# Patient Record
Sex: MALE | Race: WHITE | Employment: UNEMPLOYED | URBAN - METROPOLITAN AREA
[De-identification: names, ages, dates, MRNs, and addresses within clinical notes are randomized per-mention and may not be internally consistent; named-entity substitution may affect disease eponyms.]

---

## 2020-03-16 ENCOUNTER — HOSPITAL ENCOUNTER (OUTPATIENT)
Dept: LAB | Age: 40
Discharge: HOME | End: 2020-03-16
Payer: SELF-PAY

## 2020-03-16 DIAGNOSIS — I34.0 MITRAL VALVE INSUFFICIENCY, UNSPECIFIED ETIOLOGY: ICD-10-CM

## 2020-03-16 LAB — BNP SERPL-MCNC: 12 PG/ML (ref 5–125)

## 2020-03-16 PROCEDURE — 83880 ASSAY OF NATRIURETIC PEPTIDE: CPT

## 2020-03-16 PROCEDURE — 36415 COLL VENOUS BLD VENIPUNCTURE: CPT

## 2022-09-06 ENCOUNTER — TELEPHONE (OUTPATIENT)
Dept: CARDIOLOGY CLINIC | Age: 42
End: 2022-09-06

## 2022-09-06 NOTE — TELEPHONE ENCOUNTER
Last Sunday while driving from [facility] to [place] had heart palpitations and sense of heaviness in chest.He has had similar symptoms every since. This feels different that in the past.Has been having SOB. Just needs fu appt. ASAP.     Appt.made for tomorrow Jorge Fee

## 2022-09-06 NOTE — TELEPHONE ENCOUNTER
SOB, Palpitations, feels different, afraid that there is something wrong and it may be time to have valve replaced.

## 2022-09-07 ENCOUNTER — OFFICE VISIT (OUTPATIENT)
Dept: CARDIOLOGY CLINIC | Age: 42
End: 2022-09-07
Payer: COMMERCIAL

## 2022-09-07 VITALS
SYSTOLIC BLOOD PRESSURE: 118 MMHG | BODY MASS INDEX: 21.39 KG/M2 | HEART RATE: 70 BPM | HEIGHT: 74 IN | DIASTOLIC BLOOD PRESSURE: 78 MMHG | WEIGHT: 166.7 LBS

## 2022-09-07 DIAGNOSIS — I34.1 MVP (MITRAL VALVE PROLAPSE): ICD-10-CM

## 2022-09-07 DIAGNOSIS — Z76.89 ENCOUNTER TO ESTABLISH CARE: Primary | ICD-10-CM

## 2022-09-07 PROCEDURE — 99214 OFFICE O/P EST MOD 30 MIN: CPT | Performed by: INTERNAL MEDICINE

## 2022-09-07 PROCEDURE — 93000 ELECTROCARDIOGRAM COMPLETE: CPT | Performed by: INTERNAL MEDICINE

## 2022-09-07 RX ORDER — METOPROLOL SUCCINATE 25 MG/1
25 TABLET, EXTENDED RELEASE ORAL DAILY
Qty: 30 TABLET | Refills: 3 | Status: SHIPPED | OUTPATIENT
Start: 2022-09-07 | End: 2022-09-15

## 2022-09-08 DIAGNOSIS — I34.1 MVP (MITRAL VALVE PROLAPSE): ICD-10-CM

## 2022-09-09 LAB — NT PRO BNP: 9 PG/ML (ref 5–125)

## 2022-09-15 ENCOUNTER — TELEPHONE (OUTPATIENT)
Dept: CARDIOLOGY CLINIC | Age: 42
End: 2022-09-15

## 2022-09-15 DIAGNOSIS — I34.1 MVP (MITRAL VALVE PROLAPSE): ICD-10-CM

## 2022-09-15 RX ORDER — METOPROLOL SUCCINATE 25 MG/1
25 TABLET, EXTENDED RELEASE ORAL 2 TIMES DAILY
Qty: 60 TABLET | Refills: 11 | Status: SHIPPED | OUTPATIENT
Start: 2022-09-15

## 2022-09-15 NOTE — TELEPHONE ENCOUNTER
Taking Toprol and they told him they may have to increase the dose and he thinks that needs to be done because it wears off too soon.      Also wants the results of his echo  Please call

## 2022-09-15 NOTE — TELEPHONE ENCOUNTER
Had an ECHO last week and is calling for results. He said next question is he started Toprol xl 25mg qday and after about 12 hours it seems the med is wearing off and he starts having more palpitations. He is wondering if he should take it twice a day or increase what he is taking? He does not have a way to check BP at home. He will check BP and HR then call me back    Pt.called back /69 hr=70

## 2022-09-15 NOTE — TELEPHONE ENCOUNTER
Cristhian Sawyer MD  You 2 minutes ago (4:12 PM)     GS  Good heart fxn, leak is not worse, ok to inc toprol to bid, will discuss further atfu    I called and informed pt. of MD response and he v/u. Med escribed as below  Requested Prescriptions     Signed Prescriptions Disp Refills    metoprolol succinate (TOPROL XL) 25 MG extended release tablet 60 tablet 11     Sig: Take 1 tablet by mouth in the morning and at bedtime     Authorizing Provider: Krishna Ford     Ordering User: JUAN A MEDEROS

## 2022-10-13 ENCOUNTER — OFFICE VISIT (OUTPATIENT)
Dept: CARDIOLOGY CLINIC | Age: 42
End: 2022-10-13
Payer: COMMERCIAL

## 2022-10-13 VITALS
SYSTOLIC BLOOD PRESSURE: 118 MMHG | BODY MASS INDEX: 21.3 KG/M2 | WEIGHT: 166 LBS | DIASTOLIC BLOOD PRESSURE: 82 MMHG | HEIGHT: 74 IN | HEART RATE: 56 BPM

## 2022-10-13 DIAGNOSIS — I34.1 MVP (MITRAL VALVE PROLAPSE): Primary | ICD-10-CM

## 2022-10-13 PROCEDURE — 99214 OFFICE O/P EST MOD 30 MIN: CPT | Performed by: INTERNAL MEDICINE

## 2022-10-13 NOTE — PROGRESS NOTES
[facility] CARDIOLOGY  [address], [address] [facility] [facility], [address] [place] [address]  PHONE: [phone]        10/13/22        NAME:  Sammi Morfin  : 1980  MRN: [id number]     CHIEF COMPLAINT:    Irregular Heart Beat         SUBJECTIVE:     Palpitations  on occasion. Active, works full time. Medications were all reviewed with the patient today and updated as necessary. Current Outpatient Medications   Medication Sig    metoprolol succinate (TOPROL XL) 25 MG extended release tablet Take 1 tablet by mouth in the morning and at bedtime    melatonin 5 MG TABS tablet Take 20 mg by mouth     No current facility-administered medications for this visit. No Known Allergies        PHYSICAL EXAM:     Wt Readings from Last 3 Encounters:   10/13/22 166 lb (75.3 kg)   22 166 lb (75.3 kg)   22 166 lb 11.2 oz (75.6 kg)     BP Readings from Last 3 Encounters:   10/13/22 118/82   22 110/66   22 118/78       /82   Pulse 56   Ht 6' 2\" (1.88 m)   Wt 166 lb (75.3 kg)   BMI 21.31 kg/m²     Physical Exam  Vitals reviewed. HENT:      Head: Normocephalic and atraumatic. Eyes:      Extraocular Movements: Extraocular movements intact. Pupils: Pupils are equal, round, and reactive to light. Cardiovascular:      Rate and Rhythm: Normal rate. Heart sounds: Murmur heard. Pulmonary:      Effort: Pulmonary effort is normal.      Breath sounds: Normal breath sounds. Abdominal:      General: Abdomen is flat. Palpations: Abdomen is soft. There is no mass. Musculoskeletal:         General: Normal range of motion. Cervical back: Normal range of motion. Skin:     General: Skin is warm and dry. Neurological:      General: No focal deficit present. Mental Status: He is alert and oriented to person, place, and time.    Psychiatric:         Mood and Affect: Mood normal.         RECENT LABS AND RECORDS REVIEW    NT pro BNP 9    Echo:      Left Ventricle: Normal left ventricular systolic function with a visually estimated EF of 60 - 65%. Left ventricle size is normal. Normal wall thickness. Normal wall motion. Normal diastolic function. Mitral Valve: Moderate leaflet prolapse noted. Moderate to severe regurgitation with an eccentrically directed jet and may underestimate severity. Tricuspid Valve: The estimated RVSP is 27 mmHg. Left Atrium: Left atrium is moderately dilated. LA Vol Index is  44 ml/m2. Technical qualifiers: Color flow Doppler was performed and pulse wave and/or continuous wave Doppler was performed. ASSESSMENT and PLAN    1. MVP (mitral valve prolapse)           2. Mitral valve insufficiency, unspecified etiology           3. Pectus excavatum           4. Hx of opioid abuse         5. Dyspnea       6. Palpitation    ////    CV status is stable. Medications and most recent labs reviewed. Diet and exercise are encouraged. Greater  than 50% of today's visit was devoted to counseling the patient, explaining disease concepts and offering advice and suggestions for optimal care. There are no diagnoses linked to this encounter. Return in about 1 year (around 10/13/2023).        Eddie Britt MD  10/13/2022  9:20 AM

## 2023-09-11 DIAGNOSIS — I34.1 MVP (MITRAL VALVE PROLAPSE): ICD-10-CM

## 2023-09-11 NOTE — TELEPHONE ENCOUNTER
Requested Prescriptions     Pending Prescriptions Disp Refills    metoprolol succinate (TOPROL XL) 25 MG extended release tablet [Pharmacy Med Name: METOPROLOL ER SUCCINATE 25MG TABS] 60 tablet 2     Sig: TAKE 1 TABLET BY MOUTH TWICE DAILY IN THE MORNING AND AT BEDTIME

## 2023-09-12 RX ORDER — METOPROLOL SUCCINATE 25 MG/1
TABLET, EXTENDED RELEASE ORAL
Qty: 60 TABLET | Refills: 2 | Status: SHIPPED | OUTPATIENT
Start: 2023-09-12

## 2023-11-21 ENCOUNTER — OFFICE VISIT (OUTPATIENT)
Age: 43
End: 2023-11-21
Payer: COMMERCIAL

## 2023-11-21 VITALS
HEIGHT: 74 IN | SYSTOLIC BLOOD PRESSURE: 128 MMHG | WEIGHT: 172 LBS | HEART RATE: 60 BPM | DIASTOLIC BLOOD PRESSURE: 86 MMHG | BODY MASS INDEX: 22.07 KG/M2

## 2023-11-21 DIAGNOSIS — I34.1 MVP (MITRAL VALVE PROLAPSE): ICD-10-CM

## 2023-11-21 DIAGNOSIS — I34.1 MVP (MITRAL VALVE PROLAPSE): Primary | ICD-10-CM

## 2023-11-21 PROCEDURE — 93000 ELECTROCARDIOGRAM COMPLETE: CPT | Performed by: INTERNAL MEDICINE

## 2023-11-21 PROCEDURE — 99214 OFFICE O/P EST MOD 30 MIN: CPT | Performed by: INTERNAL MEDICINE

## 2023-11-21 RX ORDER — ATORVASTATIN CALCIUM 20 MG/1
TABLET, FILM COATED ORAL
COMMUNITY
Start: 2023-11-09

## 2023-11-21 RX ORDER — CLONAZEPAM 0.5 MG/1
TABLET ORAL
COMMUNITY
Start: 2023-11-10

## 2023-11-21 NOTE — PROGRESS NOTES
Neurological:      General: No focal deficit present. Mental Status: He is alert and oriented to person, place, and time. Psychiatric:         Mood and Affect: Mood normal.           RECENT LABS AND RECORDS REVIEW    Ekg:  Sinus  Rhythm  - occasional ectopic ventricular beat     -Left atrial enlargement. Lab:  +RF, +ÓSCAR    ASSESSMENT and PLAN    Ana Cristina Gallegos was seen today for mitral valve prolapse. Diagnoses and all orders for this visit:    MVP (mitral valve prolapse)  -     EKG 12 lead  -     Brain Natriuretic Peptide; Future  -     Echo (TTE) complete (PRN contrast/bubble/strain/3D); Future       Return in about 3 months (around 2/21/2024).        Cesario Valiente MD  11/21/2023  4:18 PM
No

## 2023-11-22 LAB — NT PRO BNP: 22 PG/ML (ref 5–125)

## 2024-01-23 ENCOUNTER — OFFICE VISIT (OUTPATIENT)
Age: 44
End: 2024-01-23
Payer: COMMERCIAL

## 2024-01-23 VITALS
HEIGHT: 74 IN | WEIGHT: 178 LBS | DIASTOLIC BLOOD PRESSURE: 70 MMHG | BODY MASS INDEX: 22.84 KG/M2 | SYSTOLIC BLOOD PRESSURE: 128 MMHG

## 2024-01-23 DIAGNOSIS — I34.1 MVP (MITRAL VALVE PROLAPSE): ICD-10-CM

## 2024-01-23 PROCEDURE — 99213 OFFICE O/P EST LOW 20 MIN: CPT | Performed by: INTERNAL MEDICINE

## 2024-01-23 RX ORDER — METOPROLOL SUCCINATE 25 MG/1
12.5 TABLET, EXTENDED RELEASE ORAL DAILY
Qty: 45 TABLET | Refills: 3 | Status: SHIPPED | OUTPATIENT
Start: 2024-01-23

## 2024-01-23 NOTE — PROGRESS NOTES
[facility] CARDIOLOGY  [address] [place] [address], [address]  [place] [address]  PHONE: [phone]        24        NAME:  Dwayne Kern  : 1980  MRN: [id number]     1. MVP (mitral valve prolapse)  2. Mitral valve insufficiency  3. Pectus excavatum  4. Hx of opioid abuse   5. Dyspnea  6. Palpitation  7. + ÓSCAR    CHIEF COMPLAINT:    Mitral Valve Prolapse      SUBJECTIVE:       Less palpitation.       Medications were all reviewed with the patient today and updated as necessary.   Current Outpatient Medications   Medication Sig    metoprolol succinate (TOPROL XL) 25 MG extended release tablet Take 0.5 tablets by mouth daily    atorvastatin (LIPITOR) 20 MG tablet     clonazePAM (KLONOPIN) 0.5 MG tablet      No current facility-administered medications for this visit.        No Known Allergies        PHYSICAL EXAM:     Wt Readings from Last 3 Encounters:   24 80.7 kg (178 lb)   24 78 kg (172 lb)   23 78 kg (172 lb)     BP Readings from Last 3 Encounters:   24 128/70   24 110/76   23 128/86       /70   Ht 1.88 m (6' 2.02\")   Wt 80.7 kg (178 lb)   BMI 22.84 kg/m²     Physical Exam  Vitals reviewed.   HENT:      Head: Normocephalic and atraumatic.   Eyes:      Extraocular Movements: Extraocular movements intact.      Pupils: Pupils are equal, round, and reactive to light.   Cardiovascular:      Rate and Rhythm: Normal rate.      Heart sounds: Murmur heard.   Pulmonary:      Effort: Pulmonary effort is normal.      Breath sounds: Normal breath sounds.   Abdominal:      General: Abdomen is flat.      Palpations: Abdomen is soft. There is no mass.   Musculoskeletal:         General: Normal range of motion.      Cervical back: Normal range of motion.   Skin:     General: Skin is warm and dry.   Neurological:      General: No focal deficit present.      Mental Status: He is alert and oriented to person, place, and time.   Psychiatric:         Mood and Affect: Mood

## 2024-02-27 ENCOUNTER — HOSPITAL ENCOUNTER (OUTPATIENT)
Dept: GENERAL RADIOLOGY | Age: 44
Discharge: HOME | End: 2024-03-01
Payer: COMMERCIAL

## 2024-02-27 DIAGNOSIS — M54.59 OTHER LOW BACK PAIN: ICD-10-CM

## 2024-02-27 PROCEDURE — 72170 X-RAY EXAM OF PELVIS: CPT

## 2024-02-27 PROCEDURE — 72100 X-RAY EXAM L-S SPINE 2/3 VWS: CPT

## 2024-07-15 ENCOUNTER — TELEPHONE (OUTPATIENT)
Age: 44
End: 2024-07-15

## 2024-07-16 NOTE — TELEPHONE ENCOUNTER
Black Coleman MD  You14 hours ago (5:40 PM)       Not at this time.   I spoke w/pt.told MD response and he said he actually feels much better today and symptoms have resolved.He will keep his fu appt.as planned and call PRN.  
Patient called back.  
Pt been has been having chest pain on an doff since last Wednesday.  Dull ache in the same spot left of sternum.  Also has dizziness, and has some undiagnosed immune issue. Not sure what is related to what.      Pt had med change on last visit and not sure if meds need to be reviewed.   
Pt.c/o CP off/on since Wed.Pain is dull cramping feeling for a week.Nothing in particular makes it worse or better.He is able to do manual and doesn't feel it as much then.Does not feel like he has pulled anything.He is not SOB.Has rhythm issues from time to time.Pain is just there unphased by any actions.Has an ECHO August 26th.    ,  Any thoughts,symptoms sound atypical..  
Initial (On Arrival)

## 2024-08-26 ENCOUNTER — OFFICE VISIT (OUTPATIENT)
Age: 44
End: 2024-08-26
Payer: COMMERCIAL

## 2024-08-26 VITALS
HEIGHT: 74 IN | WEIGHT: 171 LBS | HEART RATE: 56 BPM | DIASTOLIC BLOOD PRESSURE: 78 MMHG | BODY MASS INDEX: 21.94 KG/M2 | SYSTOLIC BLOOD PRESSURE: 120 MMHG

## 2024-08-26 DIAGNOSIS — I34.1 MVP (MITRAL VALVE PROLAPSE): Primary | ICD-10-CM

## 2024-08-26 PROCEDURE — 99214 OFFICE O/P EST MOD 30 MIN: CPT | Performed by: INTERNAL MEDICINE

## 2024-08-26 RX ORDER — MECLIZINE HYDROCHLORIDE 25 MG/1
25 TABLET ORAL 3 TIMES DAILY PRN
COMMUNITY

## 2024-08-26 RX ORDER — CELECOXIB 200 MG/1
CAPSULE ORAL
COMMUNITY

## 2024-08-26 NOTE — PROGRESS NOTES
[facility] CARDIOLOGY  [address] [place] [address], [address]  [place] [address]  PHONE: [phone]        24        NAME:  Dwayne Kern  : 1980  MRN: [id number]     1. MVP (mitral valve prolapse)  2. Mitral valve insufficiency  3. Pectus excavatum  4. Hx of opioid abuse   5. Dyspnea  6. Palpitation  7. + ÓSCAR    CHIEF COMPLAINT:    Mitral Valve Prolapse      SUBJECTIVE:     Pt. Is concerned that his pectus is now symptomatic w/ chest pain and affecting ventilatory function.  No chest pain or palpitation or dizziness.       Medications were all reviewed with the patient today and updated as necessary.   Current Outpatient Medications   Medication Sig    meclizine (ANTIVERT) 25 MG tablet Take 1 tablet by mouth 3 times daily as needed    celecoxib (CELEBREX) 200 MG capsule 1 capsule with food Orally as needed for 30 days    metoprolol succinate (TOPROL XL) 25 MG extended release tablet Take 0.5 tablets by mouth daily (Patient taking differently: Take 1 tablet by mouth daily)    atorvastatin (LIPITOR) 20 MG tablet     clonazePAM (KLONOPIN) 0.5 MG tablet      No current facility-administered medications for this visit.        No Known Allergies        PHYSICAL EXAM:     Wt Readings from Last 3 Encounters:   24 77.6 kg (171 lb)   24 80.7 kg (178 lb)   24 78 kg (172 lb)     BP Readings from Last 3 Encounters:   24 120/78   24 128/70   24 110/76       /78   Pulse 56   Ht 1.88 m (6' 2\")   Wt 77.6 kg (171 lb)   BMI 21.96 kg/m²     Physical Exam  Vitals reviewed.   HENT:      Head: Normocephalic and atraumatic.   Eyes:      Extraocular Movements: Extraocular movements intact.      Pupils: Pupils are equal, round, and reactive to light.   Cardiovascular:      Rate and Rhythm: Normal rate.      Heart sounds: Murmur heard.   Pulmonary:      Effort: Pulmonary effort is normal.      Breath sounds: Normal breath sounds.   Abdominal:      General: Abdomen is flat.

## 2024-08-27 DIAGNOSIS — I34.1 MVP (MITRAL VALVE PROLAPSE): ICD-10-CM

## 2024-08-27 LAB — NT PRO BNP: <36 PG/ML (ref 0–125)

## 2024-09-11 LAB — MUSK AB SER IA-ACNC: <1 U/ML

## 2024-11-18 ENCOUNTER — OFFICE VISIT (OUTPATIENT)
Dept: PULMONOLOGY | Age: 44
End: 2024-11-18
Payer: COMMERCIAL

## 2024-11-18 VITALS
OXYGEN SATURATION: 99 % | BODY MASS INDEX: 21.56 KG/M2 | RESPIRATION RATE: 20 BRPM | HEIGHT: 74 IN | TEMPERATURE: 98 F | HEART RATE: 75 BPM | WEIGHT: 168 LBS | DIASTOLIC BLOOD PRESSURE: 80 MMHG | SYSTOLIC BLOOD PRESSURE: 120 MMHG

## 2024-11-18 DIAGNOSIS — Q67.6 PECTUS EXCAVATUM: Primary | ICD-10-CM

## 2024-11-18 DIAGNOSIS — Z87.891 PERSONAL HISTORY OF TOBACCO USE, PRESENTING HAZARDS TO HEALTH: ICD-10-CM

## 2024-11-18 DIAGNOSIS — I34.1 MITRAL VALVE PROLAPSE: ICD-10-CM

## 2024-11-18 DIAGNOSIS — J98.4 SCARRING OF LUNG: ICD-10-CM

## 2024-11-18 DIAGNOSIS — R06.02 SOB (SHORTNESS OF BREATH): ICD-10-CM

## 2024-11-18 PROCEDURE — 99204 OFFICE O/P NEW MOD 45 MIN: CPT | Performed by: INTERNAL MEDICINE

## 2024-11-18 PROCEDURE — 99406 BEHAV CHNG SMOKING 3-10 MIN: CPT | Performed by: INTERNAL MEDICINE

## 2024-11-18 NOTE — PROGRESS NOTES
Name:  Dwayne Kern  YOB: 1980   MRN: [id number]      Office Visit: 11/18/2024       Assessment & Plan (Medical Decision Making)    Impression: 44 y.o. male with a history of tobacco abuse, now vaping, pectus excavatus, mitral valve prolapse, presenting with SOB.     Reviewed with him that pectus excavatum can sometimes need surgical repair.   His PSI does not reach the > 3.25 threshold. He does have mitral valve prolapse and may have cardiac compression based on the CT scan findings which are indications to have this corrected.     -will need to get cPFT's. If restriction is demonstrated then he would have the necessary 2 criteria to consider surgical repair.   -will schedule these and contact him with results.   -if surgical repair may be necessary will refer him to an [facility] center.   -he is also to follow up with cardiology in Jan. His last TTE showed severe MVP but only mild regurg, whereas his previous TTE Jan 2024 showed moderate to severe MR. This may also need repair regardless of his pectus excavatum.   -do not currently suspect COPD. Will hold on any inhaled therapies for now.   -discussed smoking cessation. Currently vaping. Suggested nicotine replacement combination of patch plus prn gum/lozenge/inhaler to help him quit.   Total smoking cessation is advised.  Patient's tobacco use confirmed as documented in the medical record.  Discussed various smoking cessation products including pills, patches, inhaler, gum, weaning self off, \"cold turkey\", and smoking cessation classes.  Discussed also with patient disease risk of ongoing smoking including CVA, lung cancer, and heart disease.  At this point, patient's commitment to quitting is high.  5 minutes were spent counseling patient regarding tobacco cessation.  -reviewed his CT scan with him. Minimal scarring in RML needs no specific follow up.     F/u in 3 months.       1. Pectus excavatum      2. SOB (shortness of

## 2024-12-03 ENCOUNTER — TELEPHONE (OUTPATIENT)
Dept: PULMONOLOGY | Age: 44
End: 2024-12-03

## 2024-12-09 ENCOUNTER — TELEPHONE (OUTPATIENT)
Dept: PULMONOLOGY | Age: 44
End: 2024-12-09

## 2024-12-09 NOTE — TELEPHONE ENCOUNTER
LVM X 1 attempting to reschedule missed appointment on 12/3/24.  F/u with  2/20.  Needs CPFT with MIPs and MEPs for Pectus Excavatum

## 2025-01-06 ENCOUNTER — INITIAL CONSULT (OUTPATIENT)
Age: 45
End: 2025-01-06
Payer: COMMERCIAL

## 2025-01-06 VITALS
HEIGHT: 74 IN | SYSTOLIC BLOOD PRESSURE: 120 MMHG | BODY MASS INDEX: 21.82 KG/M2 | DIASTOLIC BLOOD PRESSURE: 62 MMHG | WEIGHT: 170 LBS | HEART RATE: 60 BPM

## 2025-01-06 DIAGNOSIS — I34.1 MVP (MITRAL VALVE PROLAPSE): Primary | ICD-10-CM

## 2025-01-06 DIAGNOSIS — I34.0 NONRHEUMATIC MITRAL VALVE REGURGITATION: ICD-10-CM

## 2025-01-06 PROCEDURE — 93000 ELECTROCARDIOGRAM COMPLETE: CPT | Performed by: INTERNAL MEDICINE

## 2025-01-06 PROCEDURE — 99214 OFFICE O/P EST MOD 30 MIN: CPT | Performed by: INTERNAL MEDICINE

## 2025-01-06 RX ORDER — METOPROLOL SUCCINATE 25 MG/1
12.5 TABLET, EXTENDED RELEASE ORAL DAILY
Qty: 45 TABLET | Refills: 3 | Status: SHIPPED | OUTPATIENT
Start: 2025-01-06

## 2025-01-06 NOTE — PROGRESS NOTES
[facility] CARDIOLOGY, [address], [address]  [place] [address]  PHONE: [phone]    SUBJECTIVE: /HPI  Dwayne Kern (1980) is a 44 y.o. male seen for a follow up visit regarding the following:   Specialty Problems    None  44 year old male with a history of pectus, HTN, dyslipidemia,  presents to the cardiology from Dr. Mcpherson. He reports left side chest pain and shortness of breath intermittently. He had an ECHO done () that revealed mitral valve prolapse with elevated RA and positive ÓSCAR. Patient reported also finding abnormal COL3A1 type III. He reported his daughter was tested positive for inherited connective disorder. He is unaware of the specific connective tissue disorder.     Reviewing several echocardiograms shows that the patient has significant mitral valve prolapse of the posterior leaflet and at least on one echo has moderate to severe regurgitation and left atrial enlargement.  I recommend a MERNA to see if the valve needs to be repaired and or replaced.  There has been a mild possible drop in ejection fraction from 60-65 down to 55-60      Past Medical History, Past Surgical History, Family history, Social History, and Medications were all reviewed with the patient today and updated as necessary.    No Known Allergies  No past medical history on file.  No past surgical history on file.  No family history on file.   Social History     Tobacco Use    Smoking status: Former     Current packs/day: 0.00     Average packs/day: 1 pack/day for 20.0 years (20.0 ttl pk-yrs)     Types: Cigarettes     Start date:      Quit date: 2016     Years since quittin.0    Smokeless tobacco: Current    Tobacco comments:     vaping   Substance Use Topics    Alcohol use: Not on file       Current Outpatient Medications:     meclizine (ANTIVERT) 25 MG tablet, Take 1 tablet by mouth 3 times daily as needed, Disp: , Rfl:     metoprolol succinate (TOPROL XL) 25 MG extended release tablet, Take 0.5

## 2025-01-10 NOTE — PROGRESS NOTES
Patient pre-assessment complete for Dwayne Kern scheduled for MERNA, arrival time 1100. Patient verified using . . Instructed they can take all other medications excluding vitamins & supplements. Patient verbalizes understanding of all instructions & denies any questions at this time.

## 2025-01-13 ENCOUNTER — HOSPITAL ENCOUNTER (OUTPATIENT)
Dept: CARDIAC CATH/INVASIVE PROCEDURES | Age: 45
Discharge: HOME | End: 2025-01-13
Attending: INTERNAL MEDICINE | Admitting: INTERNAL MEDICINE
Payer: COMMERCIAL

## 2025-01-13 ENCOUNTER — APPOINTMENT (OUTPATIENT)
Dept: NON INVASIVE DIAGNOSTICS | Age: 45
End: 2025-01-13
Attending: INTERNAL MEDICINE
Payer: COMMERCIAL

## 2025-01-13 VITALS
BODY MASS INDEX: 21.82 KG/M2 | HEIGHT: 74 IN | OXYGEN SATURATION: 93 % | WEIGHT: 170 LBS | RESPIRATION RATE: 18 BRPM | HEART RATE: 70 BPM | DIASTOLIC BLOOD PRESSURE: 70 MMHG | TEMPERATURE: 98 F | SYSTOLIC BLOOD PRESSURE: 113 MMHG

## 2025-01-13 DIAGNOSIS — I34.0 NONRHEUMATIC MITRAL VALVE REGURGITATION: ICD-10-CM

## 2025-01-13 LAB
ANION GAP SERPL CALC-SCNC: 10 MMOL/L (ref 7–16)
BUN SERPL-MCNC: 10 MG/DL (ref 6–23)
CALCIUM SERPL-MCNC: 9.1 MG/DL (ref 8.8–10.2)
CHLORIDE SERPL-SCNC: 101 MMOL/L (ref 98–107)
CO2 SERPL-SCNC: 28 MMOL/L (ref 20–29)
CREAT SERPL-MCNC: 0.72 MG/DL (ref 0.8–1.3)
ECHO BSA: 2.01 M2
ECHO BSA: 2.01 M2
ECHO EST RA PRESSURE: 3 MMHG
ECHO IVC PROX: 1.4 CM
ECHO LA AREA 2C: 27.5 CM2
ECHO LA AREA 4C: 22.9 CM2
ECHO LA DIAMETER INDEX: 1.48 CM/M2
ECHO LA DIAMETER: 3 CM
ECHO LA MAJOR AXIS: 7.1 CM
ECHO LA MINOR AXIS: 6.8 CM
ECHO LA VOL BP: 87 ML (ref 18–58)
ECHO LA VOL MOD A2C: 90 ML (ref 18–58)
ECHO LA VOL MOD A4C: 62 ML (ref 18–58)
ECHO LA VOL/BSA BIPLANE: 43 ML/M2 (ref 16–34)
ECHO LA VOLUME INDEX MOD A2C: 44 ML/M2 (ref 16–34)
ECHO LA VOLUME INDEX MOD A4C: 31 ML/M2 (ref 16–34)
ECHO LV EDV A2C: 138 ML
ECHO LV EDV A4C: 124 ML
ECHO LV EDV INDEX A4C: 61 ML/M2
ECHO LV EDV NDEX A2C: 68 ML/M2
ECHO LV EJECTION FRACTION A2C: 63 %
ECHO LV EJECTION FRACTION A4C: 60 %
ECHO LV EJECTION FRACTION BIPLANE: 62 % (ref 55–100)
ECHO LV ESV A2C: 51 ML
ECHO LV ESV A4C: 50 ML
ECHO LV ESV INDEX A2C: 25 ML/M2
ECHO LV ESV INDEX A4C: 25 ML/M2
ECHO LV FRACTIONAL SHORTENING: 37 % (ref 28–44)
ECHO LV INTERNAL DIMENSION DIASTOLE INDEX: 2.51 CM/M2
ECHO LV INTERNAL DIMENSION DIASTOLIC: 5.1 CM (ref 4.2–5.9)
ECHO LV INTERNAL DIMENSION SYSTOLIC INDEX: 1.58 CM/M2
ECHO LV INTERNAL DIMENSION SYSTOLIC: 3.2 CM
ECHO LV IVSD: 0.8 CM (ref 0.6–1)
ECHO LV MASS 2D: 140.5 G (ref 88–224)
ECHO LV MASS INDEX 2D: 69.2 G/M2 (ref 49–115)
ECHO LV POSTERIOR WALL DIASTOLIC: 0.8 CM (ref 0.6–1)
ECHO LV RELATIVE WALL THICKNESS RATIO: 0.31
ECHO MV REGURGITANT ALIASING (NYQUIST) VELOCITY: 37 CM/S
ECHO MV REGURGITANT PEAK GRADIENT: 104 MMHG
ECHO MV REGURGITANT PEAK VELOCITY: 5.1 M/S
ECHO MV REGURGITANT RADIUS PISA: 0.5 CM
ECHO MV REGURGITANT RADIUS PISA: 0.5 CM
ECHO MV REGURGITANT VTIA: 165 CM
ECHO PV ACCELERATION TIME (AT): 137 MS
ECHO PV MAX VELOCITY: 0.9 M/S
ECHO PV PEAK GRADIENT: 3 MMHG
ECHO RIGHT VENTRICULAR SYSTOLIC PRESSURE (RVSP): 18 MMHG
ECHO RV INTERNAL DIMENSION: 3.5 CM
ECHO TV REGURGITANT MAX VELOCITY: 1.94 M/S
ECHO TV REGURGITANT PEAK GRADIENT: 15 MMHG
EKG ATRIAL RATE: 60 BPM
EKG DIAGNOSIS: NORMAL
EKG P AXIS: 35 DEGREES
EKG P-R INTERVAL: 166 MS
EKG Q-T INTERVAL: 460 MS
EKG QRS DURATION: 104 MS
EKG QTC CALCULATION (BAZETT): 460 MS
EKG R AXIS: 2 DEGREES
EKG T AXIS: 21 DEGREES
EKG VENTRICULAR RATE: 60 BPM
ERYTHROCYTE [DISTWIDTH] IN BLOOD BY AUTOMATED COUNT: 12.3 % (ref 11.9–14.6)
GLUCOSE SERPL-MCNC: 112 MG/DL (ref 70–99)
HCT VFR BLD AUTO: 43.1 % (ref 41.1–50.3)
HGB BLD-MCNC: 14.5 G/DL (ref 13.6–17.2)
MAGNESIUM SERPL-MCNC: 1.9 MG/DL (ref 1.8–2.4)
MCH RBC QN AUTO: 27.4 PG (ref 26.1–32.9)
MCHC RBC AUTO-ENTMCNC: 33.6 G/DL (ref 31.4–35)
MCV RBC AUTO: 81.5 FL (ref 82–102)
NRBC # BLD: 0 K/UL (ref 0–0.2)
PLATELET # BLD AUTO: 290 K/UL (ref 150–450)
PMV BLD AUTO: 8.7 FL (ref 9.4–12.3)
POTASSIUM SERPL-SCNC: 4.1 MMOL/L (ref 3.5–5.1)
RBC # BLD AUTO: 5.29 M/UL (ref 4.23–5.6)
SODIUM SERPL-SCNC: 138 MMOL/L (ref 136–145)
WBC # BLD AUTO: 5.8 K/UL (ref 4.3–11.1)

## 2025-01-13 PROCEDURE — 83735 ASSAY OF MAGNESIUM: CPT

## 2025-01-13 PROCEDURE — 93308 TTE F-UP OR LMTD: CPT

## 2025-01-13 PROCEDURE — 6370000000 HC RX 637 (ALT 250 FOR IP): Performed by: INTERNAL MEDICINE

## 2025-01-13 PROCEDURE — 99152 MOD SED SAME PHYS/QHP 5/>YRS: CPT

## 2025-01-13 PROCEDURE — 93325 DOPPLER ECHO COLOR FLOW MAPG: CPT

## 2025-01-13 PROCEDURE — 93321 DOPPLER ECHO F-UP/LMTD STD: CPT | Performed by: INTERNAL MEDICINE

## 2025-01-13 PROCEDURE — 93320 DOPPLER ECHO COMPLETE: CPT

## 2025-01-13 PROCEDURE — 93325 DOPPLER ECHO COLOR FLOW MAPG: CPT | Performed by: INTERNAL MEDICINE

## 2025-01-13 PROCEDURE — 93010 ELECTROCARDIOGRAM REPORT: CPT | Performed by: INTERNAL MEDICINE

## 2025-01-13 PROCEDURE — 80048 BASIC METABOLIC PNL TOTAL CA: CPT

## 2025-01-13 PROCEDURE — 6360000002 HC RX W HCPCS: Performed by: INTERNAL MEDICINE

## 2025-01-13 PROCEDURE — 99152 MOD SED SAME PHYS/QHP 5/>YRS: CPT | Performed by: INTERNAL MEDICINE

## 2025-01-13 PROCEDURE — 93320 DOPPLER ECHO COMPLETE: CPT | Performed by: INTERNAL MEDICINE

## 2025-01-13 PROCEDURE — 85027 COMPLETE CBC AUTOMATED: CPT

## 2025-01-13 PROCEDURE — 93005 ELECTROCARDIOGRAM TRACING: CPT | Performed by: INTERNAL MEDICINE

## 2025-01-13 PROCEDURE — 93308 TTE F-UP OR LMTD: CPT | Performed by: INTERNAL MEDICINE

## 2025-01-13 PROCEDURE — 93312 ECHO TRANSESOPHAGEAL: CPT | Performed by: INTERNAL MEDICINE

## 2025-01-13 RX ORDER — LIDOCAINE HYDROCHLORIDE 20 MG/ML
SOLUTION OROPHARYNGEAL PRN
Status: COMPLETED | OUTPATIENT
Start: 2025-01-13 | End: 2025-01-13

## 2025-01-13 RX ORDER — MIDAZOLAM HYDROCHLORIDE 1 MG/ML
INJECTION, SOLUTION INTRAMUSCULAR; INTRAVENOUS PRN
Status: COMPLETED | OUTPATIENT
Start: 2025-01-13 | End: 2025-01-13

## 2025-01-13 RX ORDER — FENTANYL CITRATE 50 UG/ML
INJECTION, SOLUTION INTRAMUSCULAR; INTRAVENOUS PRN
Status: COMPLETED | OUTPATIENT
Start: 2025-01-13 | End: 2025-01-13

## 2025-01-13 RX ADMIN — MIDAZOLAM 2 MG: 1 INJECTION INTRAMUSCULAR; INTRAVENOUS at 13:04

## 2025-01-13 RX ADMIN — MIDAZOLAM 2 MG: 1 INJECTION INTRAMUSCULAR; INTRAVENOUS at 12:56

## 2025-01-13 RX ADMIN — LIDOCAINE HYDROCHLORIDE 15 ML: 20 SOLUTION ORAL at 12:54

## 2025-01-13 RX ADMIN — MIDAZOLAM 2 MG: 1 INJECTION INTRAMUSCULAR; INTRAVENOUS at 13:00

## 2025-01-13 RX ADMIN — MIDAZOLAM 2 MG: 1 INJECTION INTRAMUSCULAR; INTRAVENOUS at 12:58

## 2025-01-13 RX ADMIN — FENTANYL CITRATE 50 MCG: 50 INJECTION, SOLUTION INTRAMUSCULAR; INTRAVENOUS at 13:02

## 2025-01-13 RX ADMIN — MIDAZOLAM 1 MG: 1 INJECTION INTRAMUSCULAR; INTRAVENOUS at 13:07

## 2025-01-13 RX ADMIN — MIDAZOLAM 2 MG: 1 INJECTION INTRAMUSCULAR; INTRAVENOUS at 13:01

## 2025-01-13 RX ADMIN — MIDAZOLAM 2 MG: 1 INJECTION INTRAMUSCULAR; INTRAVENOUS at 13:02

## 2025-01-13 RX ADMIN — FENTANYL CITRATE 50 MCG: 50 INJECTION, SOLUTION INTRAMUSCULAR; INTRAVENOUS at 12:56

## 2025-01-13 RX ADMIN — MIDAZOLAM 1 MG: 1 INJECTION INTRAMUSCULAR; INTRAVENOUS at 13:09

## 2025-01-13 NOTE — PROGRESS NOTES
Patient received to CPRU room # 16  Ambulatory from [facility]. Patient scheduled for MERNA today with Dr Zamorano. Procedure reviewed & questions answered, voiced good understanding consent obtained & placed on chart. All medications and medical history reviewed. Will prep patient per orders. Patient & family updated on plan of care.      The patient has a fraility score of 3-MANAGING WELL, based on ambulation.

## 2025-01-13 NOTE — DISCHARGE INSTRUCTIONS
AFTER YOU TRANSESOPHAGEAL ECHOCARDIOGRAM    Be sure someone else drives you home. You may feel drowsy for several hours.    Do not eat or drink for at least two hours (1454) after your procedure. Your throat will be numb and there is a risk you might have difficulty swallowing for a while. Be careful when you do eat or drink for the first time especially with hot fluids since you could easily burn your throat.    Call your doctor if:    You are bleeding from your throat or mouth.  You have trouble breathing all of a sudden.  You have chest pain or any pain that spreads to your neck, jaw, or arms.  You have questions or concerns.  You have a fever greater than 101°F.

## 2025-01-13 NOTE — PROGRESS NOTES
Discharge instructions given per orders, voiced good understanding of care, medications & follow up care. Denies any questions

## 2025-01-13 NOTE — PROGRESS NOTES
MERNA complete with   Sedation start 1256  Sedation end 1311  14mg of Versed  100mcg of Fentanyl used for sedation  Numbed with viscous lidocaine at 1254

## 2025-01-27 ENCOUNTER — OFFICE VISIT (OUTPATIENT)
Age: 45
End: 2025-01-27
Payer: COMMERCIAL

## 2025-01-27 VITALS
BODY MASS INDEX: 22.3 KG/M2 | SYSTOLIC BLOOD PRESSURE: 102 MMHG | DIASTOLIC BLOOD PRESSURE: 70 MMHG | HEART RATE: 60 BPM | HEIGHT: 74 IN | WEIGHT: 173.8 LBS

## 2025-01-27 DIAGNOSIS — I34.1 MVP (MITRAL VALVE PROLAPSE): ICD-10-CM

## 2025-01-27 DIAGNOSIS — I34.0 NONRHEUMATIC MITRAL VALVE REGURGITATION: Primary | ICD-10-CM

## 2025-01-27 PROCEDURE — 99214 OFFICE O/P EST MOD 30 MIN: CPT | Performed by: INTERNAL MEDICINE

## 2025-01-27 NOTE — PROGRESS NOTES
[facility] CARDIOLOGY, [address], [address]  [place] [address]  PHONE: [phone]    SUBJECTIVE: /HPI  Dwayne Kern (1980) is a 44 y.o. male seen for a follow up visit regarding the following:   Specialty Problems    None    This is a 43 yo M with hx of pectus excavatum, MVP, mitral valve insufficiency who is here for a follow up s/p MERNA which showed EF of 55-60% with moderate to sever mitral valve regurgitation with severe prolapse of the P2 segment. Patient is complaining of shortness of breath and chest pressure when at rest at times and sometimes when exerting himself too much. No swelling in legs. His Pro BNP is < 36.    Prior echocardiograms had shown ejection fraction 60 to 65% this is now down to 55%.  He is now exhibiting some shortness of breath and chest pressure.  He has moderate to severe mitral regurgitation which I believe is actually severe and eccentric due to his prolapse.  I believe he is a candidate for valve repair.  And I will make a referral to appropriate CT surgery for their input into this    Past Medical History, Past Surgical History, Family history, Social History, and Medications were all reviewed with the patient today and updated as necessary.    No Known Allergies  No past medical history on file.  No past surgical history on file.  No family history on file.   Social History     Tobacco Use    Smoking status: Former     Current packs/day: 0.00     Average packs/day: 1 pack/day for 20.0 years (20.0 ttl pk-yrs)     Types: Cigarettes     Start date:      Quit date: 2016     Years since quittin.0    Smokeless tobacco: Current    Tobacco comments:     vaping   Substance Use Topics    Alcohol use: Not on file       Current Outpatient Medications:     metoprolol succinate (TOPROL XL) 25 MG extended release tablet, Take 0.5 tablets by mouth daily, Disp: 45 tablet, Rfl: 3    meclizine (ANTIVERT) 25 MG tablet, Take 1 tablet by mouth 3 times daily as needed,

## 2025-02-03 ENCOUNTER — OFFICE VISIT (OUTPATIENT)
Dept: ORTHOPEDIC SURGERY | Age: 45
End: 2025-02-03
Payer: COMMERCIAL

## 2025-02-03 DIAGNOSIS — S80.02XA CONTUSION OF LEFT KNEE, INITIAL ENCOUNTER: ICD-10-CM

## 2025-02-03 DIAGNOSIS — M25.561 RIGHT KNEE PAIN, UNSPECIFIED CHRONICITY: Primary | ICD-10-CM

## 2025-02-03 DIAGNOSIS — M71.50 TRAUMATIC BURSITIS: ICD-10-CM

## 2025-02-03 PROCEDURE — 99203 OFFICE O/P NEW LOW 30 MIN: CPT | Performed by: STUDENT IN AN ORGANIZED HEALTH CARE EDUCATION/TRAINING PROGRAM

## 2025-02-03 PROCEDURE — M5036 MISC REPAREL KNEE: HCPCS | Performed by: STUDENT IN AN ORGANIZED HEALTH CARE EDUCATION/TRAINING PROGRAM

## 2025-02-03 NOTE — PROGRESS NOTES
The patient was prescribed and given an [employer] knee sleeve for the right knee, size medium.     Patient read and signed documenting they understand and agree to [facility]'s current DME return policy.   
  Orthopaedics and Sports Medicine  [facility]     This document was created using voice recognition software so frequent mistakes are possible. For any concerns about the wording of this document, please contact its creator for further clarification.

## 2025-02-21 ENCOUNTER — TELEPHONE (OUTPATIENT)
Age: 45
End: 2025-02-21

## 2025-02-21 NOTE — TELEPHONE ENCOUNTER
I received FMLA forms from the patient's employer. I contacted the patient to review.     He states he is having Valve surgery on 03/21/25 with Dr Mota at [facility]. He was referred to Dr Mota by Dr Bahena. Patient states he is currently working at will continue working until his surgery on 03/21/25.     Since the FMLA is related to his upcoming Valve surgery/Recovery, I recommended the FMLA go through Dr Mota's office since he will be doing the surgery & will be the one to clear him to return to work.     Patient states he thought the paperwork should go through Dr Mota's office but he wasn't sure. He said he will have his employer send the forms to Dr Mota's office. I asked him to let me know if he has any further questions or concerns.     The blank forms will be scanned to his chart in case they are needed at a later time.

## 2025-04-11 ENCOUNTER — TELEPHONE (OUTPATIENT)
Age: 45
End: 2025-04-11

## 2025-04-11 NOTE — TELEPHONE ENCOUNTER
Pt.had mitral valve surgery was 3/21 at [facility].He went to cardiac rehab yesterday for the 3rd visit.He forgot his coat and ran back quickly to retrieve his coat.He has had a a little discomfort to left of the sternum since.Area hurts to the touch and has been hurting since yesterday on/off.He has no swelling,BP is good,HR is good.He does feel better at rest.Incision looks good and healing well.He ask if this is to be expected?    I also advised he call [facility] about his concerns since they did his surgery.

## 2025-04-11 NOTE — TELEPHONE ENCOUNTER
Cain Zamorano MD  You6 minutes ago (10:40 AM)       Sounds musculoskeletal and would not worry about it if he is still having discomfort come Monday he can call back   I called and informed pt.of MD response and he v/u.

## 2025-04-11 NOTE — TELEPHONE ENCOUNTER
Per patient is having discomfort in his chest and just had open heart surgery so he's concerned. He said the discomfort isn't going away. ~Thank You~

## 2025-04-25 ENCOUNTER — TELEPHONE (OUTPATIENT)
Age: 45
End: 2025-04-25

## 2025-04-25 NOTE — TELEPHONE ENCOUNTER
Pt is calling saying he had  heart surgery at [facility] and they gave him muscle relaxer's  that he used  after surgery  but pt is having pain again and he spoke with [employer] Nurse who recommended him call Dr Zamorano and request  a RX for Muscle Relaxer's to be called in to [phone]

## 2025-04-25 NOTE — TELEPHONE ENCOUNTER
I called pt.he said that he has tightness in back and collar bone post mitral valve surgery at [facility].He was given a prescription for Robaxin 500mg tid PRN.He has finished the prescription and still having issues.PCP office is closed.Ask if  could send in a refill?

## 2025-04-25 NOTE — TELEPHONE ENCOUNTER
Cain Zamorano MD  You6 minutes ago (2:11 PM)       Yes I 5 to 10-day supply is acceptable   Robaxin 500mg tid PRN for pain #30 w/0 refills called into [facility].

## 2025-04-29 ENCOUNTER — TELEPHONE (OUTPATIENT)
Age: 45
End: 2025-04-29

## 2025-04-29 ENCOUNTER — OFFICE VISIT (OUTPATIENT)
Age: 45
End: 2025-04-29
Payer: COMMERCIAL

## 2025-04-29 VITALS
WEIGHT: 168 LBS | SYSTOLIC BLOOD PRESSURE: 114 MMHG | HEART RATE: 69 BPM | HEIGHT: 74 IN | BODY MASS INDEX: 21.56 KG/M2 | DIASTOLIC BLOOD PRESSURE: 60 MMHG

## 2025-04-29 DIAGNOSIS — I34.1 MVP (MITRAL VALVE PROLAPSE): ICD-10-CM

## 2025-04-29 DIAGNOSIS — I34.0 NONRHEUMATIC MITRAL VALVE REGURGITATION: ICD-10-CM

## 2025-04-29 PROCEDURE — 99214 OFFICE O/P EST MOD 30 MIN: CPT | Performed by: INTERNAL MEDICINE

## 2025-04-29 PROCEDURE — 93000 ELECTROCARDIOGRAM COMPLETE: CPT | Performed by: INTERNAL MEDICINE

## 2025-04-29 RX ORDER — AMIODARONE HYDROCHLORIDE 100 MG/1
100 TABLET ORAL DAILY
Qty: 90 TABLET | Refills: 3 | Status: SHIPPED | OUTPATIENT
Start: 2025-04-29

## 2025-04-29 RX ORDER — PANTOPRAZOLE SODIUM 40 MG/1
40 TABLET, DELAYED RELEASE ORAL DAILY
COMMUNITY
Start: 2025-03-25

## 2025-04-29 RX ORDER — ASPIRIN 81 MG/1
81 TABLET, CHEWABLE ORAL
COMMUNITY
Start: 2025-03-25 | End: 2026-03-25

## 2025-04-29 NOTE — PROGRESS NOTES
celecoxib (CELEBREX) 200 MG capsule, , Disp: , Rfl:     ROS:  Review of Systems - General ROS: negative for - chills, fatigue or fever  Hematological and Lymphatic ROS: negative for - bleeding problems, bruising or jaundice  Respiratory ROS: no cough, shortness of breath, or wheezing  Cardiovascular ROS: no chest pain or dyspnea on exertion  Gastrointestinal ROS: no abdominal pain, change in bowel habits, or black or bloody stools  Neurological ROS: no TIA or stroke symptoms  All other systems negative.    Wt Readings from Last 3 Encounters:   04/29/25 76.2 kg (168 lb)   01/27/25 78.8 kg (173 lb 12.8 oz)   01/13/25 77.1 kg (170 lb)     Temp Readings from Last 3 Encounters:   01/13/25 98 °F (36.7 °C)   11/18/24 98 °F (36.7 °C) (Temporal)     BP Readings from Last 3 Encounters:   04/29/25 114/60   01/27/25 102/70   01/13/25 113/70     Pulse Readings from Last 3 Encounters:   04/29/25 69   01/27/25 60   01/13/25 70       PHYSICAL EXAM:  /60   Pulse 69   Ht 1.88 m (6' 2\")   Wt 76.2 kg (168 lb)   BMI 21.57 kg/m²   Physical Examination: General appearance - alert, well appearing, and in no distress  Mental status - alert, oriented to person, place, and time  Eyes - pupils equal and reactive, extraocular eye movements intact  Neck/lymph - supple, no significant adenopathy  Chest/lungs - clear to auscultation, no wheezes, rales or rhonchi, symmetric air entry  Heart/CV - normal rate, regular rhythm, normal S1, S2, no murmurs, rubs, clicks or gallops  Abdomen/GI - soft, nontender, nondistended, no masses or organomegaly  Musculoskeletal - no joint tenderness, deformity or swelling  Extremities - peripheral pulses normal, no pedal edema, no clubbing or cyanosis  Skin - normal coloration and turgor, no rashes, no suspicious skin lesions noted    EKG: normal EKG, normal sinus rhythm.  Frequent PACs and nonspecific interventricular conduction          Medications reviewed and questions answered    No results found for

## 2025-04-29 NOTE — TELEPHONE ENCOUNTER
Rehab called him this AM and said he needs to see Dr Zamorano this week They sent strips by fax for us to see he said Please call

## 2025-04-29 NOTE — TELEPHONE ENCOUNTER
Joellen sent over rhythm strips that showed change in rhythm when he stopped Amiodarone.He only got a month supply from [facility] and has ran out of Amiodarone so he just assumed he was not to take it any longer.He was taking Amiodarone 200mg qday for 30 days post [facility] at [facility].Pt.reports he can feel his heart beating irreg.most of the time.HR is up and down all night and sometimes awakened by irreg.beats.HR was reading in 30's last night and he was feeling dizzy.Appt.made for today in open slot.

## 2025-07-21 ENCOUNTER — OFFICE VISIT (OUTPATIENT)
Age: 45
End: 2025-07-21
Payer: COMMERCIAL

## 2025-07-21 VITALS
DIASTOLIC BLOOD PRESSURE: 62 MMHG | HEIGHT: 74 IN | SYSTOLIC BLOOD PRESSURE: 98 MMHG | BODY MASS INDEX: 21.94 KG/M2 | WEIGHT: 171 LBS | HEART RATE: 61 BPM

## 2025-07-21 DIAGNOSIS — I34.1 MVP (MITRAL VALVE PROLAPSE): ICD-10-CM

## 2025-07-21 DIAGNOSIS — I34.0 NONRHEUMATIC MITRAL VALVE REGURGITATION: Primary | ICD-10-CM

## 2025-07-21 PROCEDURE — 99214 OFFICE O/P EST MOD 30 MIN: CPT | Performed by: INTERNAL MEDICINE

## 2025-07-21 RX ORDER — METOPROLOL SUCCINATE 25 MG/1
25 TABLET, EXTENDED RELEASE ORAL DAILY
Qty: 90 TABLET | Refills: 3 | Status: SHIPPED | OUTPATIENT
Start: 2025-07-21

## 2025-07-21 NOTE — PROGRESS NOTES
such as chest pain shortness of breath or palpitations occur    4.  Risk factor management: Discussed the importance of maintaining optimal blood pressure and blood sugar levels reinforced adherence to antihypertensive and antidiabetic medications if appropriate    5.  Patient education and reassurance: Provided education on recognizing the signs and symptoms of worsening cardiovascular disease.  The patient was reassured about the stability of their condition and the effectiveness of their current management plan        Continue meds as below    Current Outpatient Medications:     apixaban (ELIQUIS) 5 MG TABS tablet, Take 1 tablet by mouth in the morning and 1 tablet in the evening., Disp: , Rfl:     aspirin 81 MG chewable tablet, Take 1 tablet by mouth, Disp: , Rfl:     pantoprazole (PROTONIX) 40 MG tablet, Take 1 tablet by mouth daily, Disp: , Rfl:     amiodarone (PACERONE) 100 MG tablet, Take 1 tablet by mouth daily, Disp: 90 tablet, Rfl: 3    metoprolol succinate (TOPROL XL) 25 MG extended release tablet, Take 0.5 tablets by mouth daily, Disp: 45 tablet, Rfl: 3    meclizine (ANTIVERT) 25 MG tablet, Take 1 tablet by mouth 3 times daily as needed, Disp: , Rfl:     celecoxib (CELEBREX) 200 MG capsule, , Disp: , Rfl:     atorvastatin (LIPITOR) 20 MG tablet, , Disp: , Rfl:     clonazePAM (KLONOPIN) 0.5 MG tablet, as needed., Disp: , Rfl:     No orders of the defined types were placed in this encounter.       KEITH ESTEVEZ MD  7/21/2025  9:05 AM    Appropriate evaluation of guideline directed medical therapy for the patient's conditions have been reviewed.  If appropriate, adjustment of therapy for underlying cardiac issues has been offered.  If patient wishes for adjustment of therapy which would include intensification of pharmacologic therapy for any above conditions this will be sent to appropriate pharmacy.  If patient politely declines any further changes they will be encouraged to continue with current